# Patient Record
Sex: MALE | Race: WHITE | NOT HISPANIC OR LATINO | URBAN - METROPOLITAN AREA
[De-identification: names, ages, dates, MRNs, and addresses within clinical notes are randomized per-mention and may not be internally consistent; named-entity substitution may affect disease eponyms.]

---

## 2023-10-02 ENCOUNTER — HOSPITAL ENCOUNTER (EMERGENCY)
Facility: HOSPITAL | Age: 33
Discharge: STILL ADMITTED OR ADMINISTRATIVE | DRG: 885 | End: 2023-10-03
Attending: EMERGENCY MEDICINE

## 2023-10-02 VITALS
RESPIRATION RATE: 16 BRPM | SYSTOLIC BLOOD PRESSURE: 142 MMHG | OXYGEN SATURATION: 98 % | BODY MASS INDEX: 22.73 KG/M2 | HEIGHT: 68 IN | HEART RATE: 90 BPM | DIASTOLIC BLOOD PRESSURE: 78 MMHG | TEMPERATURE: 98.1 F | WEIGHT: 150 LBS

## 2023-10-02 DIAGNOSIS — R45.851 SUICIDAL IDEATION: ICD-10-CM

## 2023-10-02 DIAGNOSIS — R45.850 HOMICIDAL IDEATION: ICD-10-CM

## 2023-10-02 DIAGNOSIS — F99 PSYCHIATRIC DIAGNOSIS: Primary | ICD-10-CM

## 2023-10-02 LAB
ALBUMIN SERPL BCP-MCNC: 4.4 G/DL (ref 3.4–5)
ALP SERPL-CCNC: 50 U/L (ref 33–120)
ALT SERPL W P-5'-P-CCNC: 13 U/L (ref 10–52)
AMPHETAMINES UR QL SCN: ABNORMAL
ANION GAP SERPL CALC-SCNC: 11 MMOL/L (ref 10–20)
APAP SERPL-MCNC: <10 UG/ML
AST SERPL W P-5'-P-CCNC: 14 U/L (ref 9–39)
BARBITURATES UR QL SCN: ABNORMAL
BASOPHILS # BLD AUTO: 0.03 X10*3/UL (ref 0–0.1)
BASOPHILS NFR BLD AUTO: 0.2 %
BENZODIAZ UR QL SCN: ABNORMAL
BILIRUB SERPL-MCNC: 1.2 MG/DL (ref 0–1.2)
BUN SERPL-MCNC: 20 MG/DL (ref 6–23)
BZE UR QL SCN: ABNORMAL
CALCIUM SERPL-MCNC: 9.5 MG/DL (ref 8.6–10.3)
CANNABINOIDS UR QL SCN: ABNORMAL
CHLORIDE SERPL-SCNC: 103 MMOL/L (ref 98–107)
CK SERPL-CCNC: 100 U/L (ref 0–325)
CO2 SERPL-SCNC: 29 MMOL/L (ref 21–32)
CREAT SERPL-MCNC: 0.76 MG/DL (ref 0.5–1.3)
EOSINOPHIL # BLD AUTO: 0.07 X10*3/UL (ref 0–0.7)
EOSINOPHIL NFR BLD AUTO: 0.5 %
ERYTHROCYTE [DISTWIDTH] IN BLOOD BY AUTOMATED COUNT: 12 % (ref 11.5–14.5)
ETHANOL SERPL-MCNC: <10 MG/DL
FENTANYL+NORFENTANYL UR QL SCN: ABNORMAL
GFR SERPL CREATININE-BSD FRML MDRD: >90 ML/MIN/1.73M*2
GLUCOSE SERPL-MCNC: 100 MG/DL (ref 74–99)
HCT VFR BLD AUTO: 44 % (ref 41–52)
HGB BLD-MCNC: 15.5 G/DL (ref 13.5–17.5)
IMM GRANULOCYTES # BLD AUTO: 0.04 X10*3/UL (ref 0–0.7)
IMM GRANULOCYTES NFR BLD AUTO: 0.3 % (ref 0–0.9)
LYMPHOCYTES # BLD AUTO: 3.11 X10*3/UL (ref 1.2–4.8)
LYMPHOCYTES NFR BLD AUTO: 22.3 %
MCH RBC QN AUTO: 31.6 PG (ref 26–34)
MCHC RBC AUTO-ENTMCNC: 35.2 G/DL (ref 32–36)
MCV RBC AUTO: 90 FL (ref 80–100)
MONOCYTES # BLD AUTO: 1.04 X10*3/UL (ref 0.1–1)
MONOCYTES NFR BLD AUTO: 7.5 %
NEUTROPHILS # BLD AUTO: 9.63 X10*3/UL (ref 1.2–7.7)
NEUTROPHILS NFR BLD AUTO: 69.2 %
NRBC BLD-RTO: 0 /100 WBCS (ref 0–0)
OPIATES UR QL SCN: ABNORMAL
OXYCODONE+OXYMORPHONE UR QL SCN: ABNORMAL
PCP UR QL SCN: ABNORMAL
PLATELET # BLD AUTO: 243 X10*3/UL (ref 150–450)
PMV BLD AUTO: 10.4 FL (ref 7.5–11.5)
POTASSIUM SERPL-SCNC: 3.2 MMOL/L (ref 3.5–5.3)
PROT SERPL-MCNC: 6.9 G/DL (ref 6.4–8.2)
RBC # BLD AUTO: 4.9 X10*6/UL (ref 4.5–5.9)
SALICYLATES SERPL-MCNC: <3 MG/DL
SARS-COV-2 RNA RESP QL NAA+PROBE: NOT DETECTED
SODIUM SERPL-SCNC: 140 MMOL/L (ref 136–145)
WBC # BLD AUTO: 13.9 X10*3/UL (ref 4.4–11.3)

## 2023-10-02 PROCEDURE — 82550 ASSAY OF CK (CPK): CPT | Performed by: PHYSICIAN ASSISTANT

## 2023-10-02 PROCEDURE — 80349 CANNABINOIDS NATURAL: CPT | Performed by: PHYSICIAN ASSISTANT

## 2023-10-02 PROCEDURE — 85025 COMPLETE CBC W/AUTO DIFF WBC: CPT | Performed by: PHYSICIAN ASSISTANT

## 2023-10-02 PROCEDURE — 36415 COLL VENOUS BLD VENIPUNCTURE: CPT | Performed by: PHYSICIAN ASSISTANT

## 2023-10-02 PROCEDURE — 99285 EMERGENCY DEPT VISIT HI MDM: CPT | Performed by: EMERGENCY MEDICINE

## 2023-10-02 PROCEDURE — 80320 DRUG SCREEN QUANTALCOHOLS: CPT | Performed by: PHYSICIAN ASSISTANT

## 2023-10-02 PROCEDURE — 80053 COMPREHEN METABOLIC PANEL: CPT | Performed by: PHYSICIAN ASSISTANT

## 2023-10-02 PROCEDURE — 80307 DRUG TEST PRSMV CHEM ANLYZR: CPT | Performed by: PHYSICIAN ASSISTANT

## 2023-10-02 PROCEDURE — 87635 SARS-COV-2 COVID-19 AMP PRB: CPT | Performed by: EMERGENCY MEDICINE

## 2023-10-02 SDOH — HEALTH STABILITY: MENTAL HEALTH: HAVE YOU ACTUALLY HAD ANY THOUGHTS OF KILLING YOURSELF?: YES

## 2023-10-02 SDOH — HEALTH STABILITY: MENTAL HEALTH: NON-SPECIFIC ACTIVE SUICIDAL THOUGHTS (PAST 1 MONTH): YES

## 2023-10-02 SDOH — HEALTH STABILITY: MENTAL HEALTH: HAVE YOU BEEN THINKING ABOUT HOW YOU MIGHT DO THIS?: NO

## 2023-10-02 SDOH — HEALTH STABILITY: MENTAL HEALTH
HAVE YOU STARTED TO WORK OUT OR WORKED OUT THE DETAILS OF HOW TO KILL YOURSELF? DO YOU INTENT TO CARRY OUT THIS PLAN?: NO

## 2023-10-02 SDOH — HEALTH STABILITY: MENTAL HEALTH

## 2023-10-02 SDOH — HEALTH STABILITY: MENTAL HEALTH: SUICIDE ASSESSMENT: ADULT (C-SSRS)

## 2023-10-02 SDOH — HEALTH STABILITY: MENTAL HEALTH: ANXIETY SYMPTOMS: GENERALIZED

## 2023-10-02 SDOH — HEALTH STABILITY: MENTAL HEALTH: IN THE PAST FEW WEEKS, HAVE YOU FELT THAT YOU OR YOUR FAMILY WOULD BE BETTER OFF IF YOU WERE DEAD?: YES

## 2023-10-02 SDOH — HEALTH STABILITY: MENTAL HEALTH: HAVE YOU HAD THESE THOUGHTS AND HAD SOME INTENTION OF ACTING ON THEM?: NO

## 2023-10-02 SDOH — SOCIAL STABILITY: SOCIAL INSECURITY: FAMILY BEHAVIORS: APPROPRIATE FOR SITUATION

## 2023-10-02 SDOH — HEALTH STABILITY: MENTAL HEALTH: NEEDS EXPRESSED: DENIES

## 2023-10-02 SDOH — HEALTH STABILITY: MENTAL HEALTH: HAVE YOU HAD THESE THOUGHTS AND HAD SOME INTENTION OF ACTING ON THEM?: YES

## 2023-10-02 SDOH — HEALTH STABILITY: MENTAL HEALTH: HAVE YOU ACTUALLY HAD ANY THOUGHTS OF KILLING YOURSELF?: NO

## 2023-10-02 SDOH — HEALTH STABILITY: MENTAL HEALTH: HAVE YOU EVER TRIED TO KILL YOURSELF?: YES

## 2023-10-02 SDOH — HEALTH STABILITY: MENTAL HEALTH: BEHAVIORS/MOOD: CALM;COOPERATIVE

## 2023-10-02 SDOH — ECONOMIC STABILITY: GENERAL

## 2023-10-02 SDOH — HEALTH STABILITY: MENTAL HEALTH: SLEEP PATTERN: DIFFICULTY FALLING ASLEEP

## 2023-10-02 SDOH — HEALTH STABILITY: MENTAL HEALTH: HAVE YOU EVER DONE ANYTHING, STARTED TO DO ANYTHING, OR PREPARED TO DO ANYTHING TO END YOUR LIFE?: NO

## 2023-10-02 SDOH — HEALTH STABILITY: MENTAL HEALTH: SUICIDAL BEHAVIOR (3 MONTHS): NO

## 2023-10-02 SDOH — HEALTH STABILITY: MENTAL HEALTH
DEPRESSION SYMPTOMS: APPETITE CHANGE;CHANGE IN ENERGY LEVEL;FEELINGS OF HELPLESSNESS;FEELINGS OF HOPELESSESS;FEELINGS OF WORTHLESSNESS;INCREASED IRRITABILITY;ISOLATIVE;LOSS OF INTEREST;SLEEP DISTURBANCE

## 2023-10-02 SDOH — ECONOMIC STABILITY: HOUSING INSECURITY: FEELS SAFE LIVING IN HOME: YES

## 2023-10-02 SDOH — SOCIAL STABILITY: SOCIAL NETWORK: VISITOR BEHAVIORS: APPROPRIATE FOR SITUATION;CALM

## 2023-10-02 SDOH — HEALTH STABILITY: MENTAL HEALTH: SLEEP PATTERN: RESTLESSNESS

## 2023-10-02 SDOH — HEALTH STABILITY: MENTAL HEALTH: HAVE YOU WISHED YOU WERE DEAD OR WISHED YOU COULD GO TO SLEEP AND NOT WAKE UP?: NO

## 2023-10-02 SDOH — HEALTH STABILITY: MENTAL HEALTH: IN THE PAST FEW WEEKS, HAVE YOU WISHED YOU WERE DEAD?: YES

## 2023-10-02 SDOH — HEALTH STABILITY: MENTAL HEALTH: IN THE PAST WEEK, HAVE YOU BEEN HAVING THOUGHTS ABOUT KILLING YOURSELF?: YES

## 2023-10-02 SDOH — HEALTH STABILITY: MENTAL HEALTH: HAVE YOU BEEN THINKING ABOUT HOW YOU MIGHT DO THIS?: YES

## 2023-10-02 SDOH — HEALTH STABILITY: MENTAL HEALTH: ACTIVE SUICIDAL IDEATION WITH SPECIFIC PLAN AND INTENT (PAST 1 MONTH): NO

## 2023-10-02 SDOH — HEALTH STABILITY: MENTAL HEALTH: HOW DID YOU TRY TO KILL YOURSELF?: OVERDOSE, DEATH BY POLICE

## 2023-10-02 SDOH — HEALTH STABILITY: MENTAL HEALTH: ACTIVE SUICIDAL IDEATION WITH SOME INTENT TO ACT, WITHOUT SPECIFIC PLAN (PAST 1 MONTH): NO

## 2023-10-02 SDOH — HEALTH STABILITY: MENTAL HEALTH: ARE YOU HAVING THOUGHTS OF KILLING YOURSELF RIGHT NOW?: NO

## 2023-10-02 SDOH — HEALTH STABILITY: MENTAL HEALTH: WISH TO BE DEAD (PAST 1 MONTH): YES

## 2023-10-02 SDOH — HEALTH STABILITY: MENTAL HEALTH: SUICIDAL BEHAVIOR (LIFETIME): YES

## 2023-10-02 SDOH — HEALTH STABILITY: MENTAL HEALTH: HAVE YOU WISHED YOU WERE DEAD OR WISHED YOU COULD GO TO SLEEP AND NOT WAKE UP?: YES

## 2023-10-02 SDOH — SOCIAL STABILITY: SOCIAL NETWORK: VISITOR BEHAVIORS: APPROPRIATE FOR SITUATION

## 2023-10-02 SDOH — HEALTH STABILITY: MENTAL HEALTH: BEHAVIORS/MOOD: ANXIOUS

## 2023-10-02 ASSESSMENT — COLUMBIA-SUICIDE SEVERITY RATING SCALE - C-SSRS
2. HAVE YOU ACTUALLY HAD ANY THOUGHTS OF KILLING YOURSELF?: NO
6. HAVE YOU EVER DONE ANYTHING, STARTED TO DO ANYTHING, OR PREPARED TO DO ANYTHING TO END YOUR LIFE?: NO
2. HAVE YOU ACTUALLY HAD ANY THOUGHTS OF KILLING YOURSELF?: NO
1. IN THE PAST MONTH, HAVE YOU WISHED YOU WERE DEAD OR WISHED YOU COULD GO TO SLEEP AND NOT WAKE UP?: NO
6. HAVE YOU EVER DONE ANYTHING, STARTED TO DO ANYTHING, OR PREPARED TO DO ANYTHING TO END YOUR LIFE?: NO
1. SINCE LAST CONTACT, HAVE YOU WISHED YOU WERE DEAD OR WISHED YOU COULD GO TO SLEEP AND NOT WAKE UP?: NO

## 2023-10-02 ASSESSMENT — PAIN - FUNCTIONAL ASSESSMENT: PAIN_FUNCTIONAL_ASSESSMENT: 0-10

## 2023-10-02 ASSESSMENT — LIFESTYLE VARIABLES
EVER HAD A DRINK FIRST THING IN THE MORNING TO STEADY YOUR NERVES TO GET RID OF A HANGOVER: NO
HAVE PEOPLE ANNOYED YOU BY CRITICIZING YOUR DRINKING: NO
SUBSTANCE_ABUSE_PAST_12_MONTHS: NO
EVER FELT BAD OR GUILTY ABOUT YOUR DRINKING: NO
HAVE YOU EVER FELT YOU SHOULD CUT DOWN ON YOUR DRINKING: NO
PRESCIPTION_ABUSE_PAST_12_MONTHS: NO

## 2023-10-02 ASSESSMENT — PAIN SCALES - GENERAL: PAINLEVEL_OUTOF10: 0 - NO PAIN

## 2023-10-02 NOTE — ED PROVIDER NOTES
HPI   Chief Complaint   Patient presents with    Depression     Pt states he would like to be [facility] to [facility]. Pt states he was diagnosed with bipolar disorder and states he does not take prescribed medications. Pt denies HI/SI.       33-year-old male with history of schizophrenia states he has had homicidal and suicidal ideations for approximately 3 weeks.  Patient denies any audio or visual hallucinations.  Patient states he his emotions are accelerating and he fears he will either hurt himself or someone else.  Patient currently has no physician for behavioral health and is currently not taking any medications for his conditions.                          No data recorded                Patient History   No past medical history on file.  No past surgical history on file.  No family history on file.  Social History     Tobacco Use    Smoking status: Not on file    Smokeless tobacco: Not on file   Substance Use Topics    Alcohol use: Not on file    Drug use: Not on file       Physical Exam   ED Triage Vitals [10/02/23 1613]   Temp Heart Rate Resp BP   36.7 °C (98.1 °F) 90 16 142/78      SpO2 Temp Source Heart Rate Source Patient Position   98 % Temporal Monitor Sitting      BP Location FiO2 (%)     Left arm --       Physical Exam  Vitals and nursing note reviewed.   Constitutional:       General: He is not in acute distress.     Appearance: He is well-developed.   HENT:      Head: Normocephalic and atraumatic.   Eyes:      Conjunctiva/sclera: Conjunctivae normal.   Cardiovascular:      Rate and Rhythm: Normal rate and regular rhythm.      Heart sounds: No murmur heard.  Pulmonary:      Effort: Pulmonary effort is normal. No respiratory distress.      Breath sounds: Normal breath sounds.   Abdominal:      Palpations: Abdomen is soft.      Tenderness: There is no abdominal tenderness.   Musculoskeletal:         General: No swelling.      Cervical back: Neck supple.   Skin:     General: Skin is warm and dry.       Capillary Refill: Capillary refill takes less than 2 seconds.   Neurological:      Mental Status: He is alert.   Psychiatric:         Mood and Affect: Mood normal.         ED Course & MDM        Medical Decision Making  HPI:33-year-old male with history of schizophrenia states he has had homicidal and suicidal ideations for approximately 3 weeks.  Patient denies any audio or visual hallucinations.  Patient states he his emotions are accelerating and he fears he will either hurt himself or someone else.  Patient currently has no physician for behavioral health and is currently not taking any medications for his conditions.    Differential diagnosis:    Pertinent physical exam:    Laboratory results:    Radiologic results:    EKG results: Please see procedure note twelve-lead EKG per my interpretation demonstrates normal sinus rhythm ventricular rate of 89 no ischemic or ectopic activity    ED course and treatment: Patient will be signed out to the oncoming provider and disposition will be made following EPAT evaluation.    Diagnosis:    Disposition:    Social determinants of health:    *This documentation is completed using the Dragon Dictation system. There may be spelling and/or grammatical errors that were not corrected prior to final submission. I apologize for any inconvenience.*              Procedure  Procedures     Hernando Cartwright PA-C  10/02/23 1856       Hernando Cartwright PA-C  10/02/23 2005

## 2023-10-02 NOTE — Clinical Note
Is the patient on isolation?: No   Do you expect the patient to require telemetry (informational-only for bed management): No

## 2023-10-03 ENCOUNTER — APPOINTMENT (OUTPATIENT)
Dept: CARDIOLOGY | Facility: HOSPITAL | Age: 33
End: 2023-10-03

## 2023-10-03 ENCOUNTER — HOSPITAL ENCOUNTER (INPATIENT)
Facility: HOSPITAL | Age: 33
LOS: 3 days | Discharge: HOME | DRG: 885 | End: 2023-10-06
Attending: PSYCHIATRY & NEUROLOGY | Admitting: PSYCHIATRY & NEUROLOGY

## 2023-10-03 DIAGNOSIS — F31.75 BIPOLAR DISORDER, IN PARTIAL REMISSION, MOST RECENT EPISODE DEPRESSED (MULTI): Primary | ICD-10-CM

## 2023-10-03 DIAGNOSIS — F99 PSYCHIATRIC DISORDER: ICD-10-CM

## 2023-10-03 LAB
ANION GAP SERPL CALC-SCNC: 15 MMOL/L (ref 10–20)
ATRIAL RATE: 56 BPM
BUN SERPL-MCNC: 18 MG/DL (ref 6–23)
CALCIUM SERPL-MCNC: 9.3 MG/DL (ref 8.6–10.3)
CHLORIDE SERPL-SCNC: 105 MMOL/L (ref 98–107)
CHOLEST SERPL-MCNC: 102 MG/DL (ref 0–199)
CHOLESTEROL/HDL RATIO: 2.3
CO2 SERPL-SCNC: 24 MMOL/L (ref 21–32)
CREAT SERPL-MCNC: 0.75 MG/DL (ref 0.5–1.3)
GFR SERPL CREATININE-BSD FRML MDRD: >90 ML/MIN/1.73M*2
GLUCOSE P FAST SERPL-MCNC: 96 MG/DL (ref 74–99)
GLUCOSE SERPL-MCNC: 91 MG/DL (ref 74–99)
HDLC SERPL-MCNC: 43.6 MG/DL
LDLC SERPL CALC-MCNC: 40 MG/DL (ref 130–180)
NON HDL CHOLESTEROL: 58 MG/DL (ref 0–149)
P AXIS: 39 DEGREES
P OFFSET: 194 MS
P ONSET: 138 MS
POTASSIUM SERPL-SCNC: 3.9 MMOL/L (ref 3.5–5.3)
PR INTERVAL: 154 MS
Q ONSET: 215 MS
QRS COUNT: 10 BEATS
QRS DURATION: 98 MS
QT INTERVAL: 404 MS
QTC CALCULATION(BAZETT): 389 MS
QTC FREDERICIA: 395 MS
R AXIS: 46 DEGREES
SODIUM SERPL-SCNC: 140 MMOL/L (ref 136–145)
T AXIS: 41 DEGREES
T OFFSET: 417 MS
TRIGL SERPL-MCNC: 92 MG/DL (ref 0–149)
VENTRICULAR RATE: 56 BPM
VLDL: 18 MG/DL (ref 0–40)

## 2023-10-03 PROCEDURE — 36415 COLL VENOUS BLD VENIPUNCTURE: CPT | Performed by: PSYCHIATRY & NEUROLOGY

## 2023-10-03 PROCEDURE — 82947 ASSAY GLUCOSE BLOOD QUANT: CPT | Performed by: PSYCHIATRY & NEUROLOGY

## 2023-10-03 PROCEDURE — 80061 LIPID PANEL: CPT | Performed by: PSYCHIATRY & NEUROLOGY

## 2023-10-03 PROCEDURE — 2500000001 HC RX 250 WO HCPCS SELF ADMINISTERED DRUGS (ALT 637 FOR MEDICARE OP): Performed by: PSYCHIATRY & NEUROLOGY

## 2023-10-03 PROCEDURE — 99222 1ST HOSP IP/OBS MODERATE 55: CPT | Performed by: REGISTERED NURSE

## 2023-10-03 PROCEDURE — 93005 ELECTROCARDIOGRAM TRACING: CPT

## 2023-10-03 PROCEDURE — 99253 IP/OBS CNSLTJ NEW/EST LOW 45: CPT | Performed by: REGISTERED NURSE

## 2023-10-03 PROCEDURE — 2500000001 HC RX 250 WO HCPCS SELF ADMINISTERED DRUGS (ALT 637 FOR MEDICARE OP): Performed by: REGISTERED NURSE

## 2023-10-03 PROCEDURE — 1240000001 HC SEMI-PRIVATE BH ROOM DAILY

## 2023-10-03 PROCEDURE — 80048 BASIC METABOLIC PNL TOTAL CA: CPT | Performed by: REGISTERED NURSE

## 2023-10-03 RX ORDER — DIPHENHYDRAMINE HYDROCHLORIDE 50 MG/ML
50 INJECTION INTRAMUSCULAR; INTRAVENOUS ONCE AS NEEDED
Status: CANCELLED | OUTPATIENT
Start: 2023-10-03

## 2023-10-03 RX ORDER — DIPHENHYDRAMINE HYDROCHLORIDE 50 MG/ML
50 INJECTION INTRAMUSCULAR; INTRAVENOUS ONCE AS NEEDED
Status: DISCONTINUED | OUTPATIENT
Start: 2023-10-03 | End: 2023-10-06 | Stop reason: HOSPADM

## 2023-10-03 RX ORDER — ACETAMINOPHEN 325 MG/1
650 TABLET ORAL EVERY 4 HOURS PRN
Status: DISCONTINUED | OUTPATIENT
Start: 2023-10-03 | End: 2023-10-06 | Stop reason: HOSPADM

## 2023-10-03 RX ORDER — DIPHENHYDRAMINE HCL 25 MG
50 CAPSULE ORAL EVERY 6 HOURS PRN
Status: DISCONTINUED | OUTPATIENT
Start: 2023-10-03 | End: 2023-10-06 | Stop reason: HOSPADM

## 2023-10-03 RX ORDER — DIPHENHYDRAMINE HCL 25 MG
50 CAPSULE ORAL EVERY 6 HOURS PRN
Status: CANCELLED | OUTPATIENT
Start: 2023-10-03

## 2023-10-03 RX ORDER — ACETAMINOPHEN 325 MG/1
650 TABLET ORAL EVERY 4 HOURS PRN
Status: CANCELLED | OUTPATIENT
Start: 2023-10-03

## 2023-10-03 RX ORDER — HYDROXYZINE HYDROCHLORIDE 25 MG/1
50 TABLET, FILM COATED ORAL EVERY 6 HOURS PRN
Status: CANCELLED | OUTPATIENT
Start: 2023-10-03

## 2023-10-03 RX ORDER — OLANZAPINE 5 MG/1
5 TABLET ORAL EVERY 6 HOURS PRN
Status: CANCELLED | OUTPATIENT
Start: 2023-10-03

## 2023-10-03 RX ORDER — OLANZAPINE 5 MG/1
5 TABLET ORAL EVERY 6 HOURS PRN
Status: DISCONTINUED | OUTPATIENT
Start: 2023-10-03 | End: 2023-10-06 | Stop reason: HOSPADM

## 2023-10-03 RX ORDER — HYDROXYZINE HYDROCHLORIDE 25 MG/1
50 TABLET, FILM COATED ORAL EVERY 6 HOURS PRN
Status: DISCONTINUED | OUTPATIENT
Start: 2023-10-03 | End: 2023-10-06 | Stop reason: HOSPADM

## 2023-10-03 RX ORDER — OLANZAPINE 10 MG/2ML
5 INJECTION, POWDER, FOR SOLUTION INTRAMUSCULAR EVERY 6 HOURS PRN
Status: DISCONTINUED | OUTPATIENT
Start: 2023-10-03 | End: 2023-10-06 | Stop reason: HOSPADM

## 2023-10-03 RX ORDER — DIVALPROEX SODIUM 250 MG/1
250 TABLET, FILM COATED, EXTENDED RELEASE ORAL NIGHTLY
Status: DISCONTINUED | OUTPATIENT
Start: 2023-10-03 | End: 2023-10-04

## 2023-10-03 RX ORDER — OLANZAPINE 10 MG/2ML
5 INJECTION, POWDER, FOR SOLUTION INTRAMUSCULAR EVERY 6 HOURS PRN
Status: CANCELLED | OUTPATIENT
Start: 2023-10-03

## 2023-10-03 RX ADMIN — HYDROXYZINE HYDROCHLORIDE 50 MG: 25 TABLET ORAL at 20:17

## 2023-10-03 RX ADMIN — DIVALPROEX SODIUM 250 MG: 250 TABLET, FILM COATED, EXTENDED RELEASE ORAL at 20:14

## 2023-10-03 SDOH — HEALTH STABILITY: MENTAL HEALTH: HAVE YOU HAD THESE THOUGHTS AND HAD SOME INTENTION OF ACTING ON THEM?: NO

## 2023-10-03 SDOH — HEALTH STABILITY: MENTAL HEALTH: HAVE YOU USED ANY SUBSTANCES (CANABIS, COCAINE, HEROIN, HALLUCINOGENS, INHALANTS, ETC.) IN THE PAST 12 MONTHS?: YES

## 2023-10-03 SDOH — HEALTH STABILITY: MENTAL HEALTH: HAVE YOU ACTUALLY HAD ANY THOUGHTS OF KILLING YOURSELF?: NO

## 2023-10-03 SDOH — HEALTH STABILITY: MENTAL HEALTH: HAVE YOU BEEN THINKING ABOUT HOW YOU MIGHT DO THIS?: NO

## 2023-10-03 SDOH — HEALTH STABILITY: MENTAL HEALTH: HAVE YOU EVER DONE ANYTHING, STARTED TO DO ANYTHING, OR PREPARED TO DO ANYTHING TO END YOUR LIFE?: NO

## 2023-10-03 SDOH — HEALTH STABILITY: MENTAL HEALTH: HAVE YOU WISHED YOU WERE DEAD OR WISHED YOU COULD GO TO SLEEP AND NOT WAKE UP?: NO

## 2023-10-03 SDOH — HEALTH STABILITY: MENTAL HEALTH: BEHAVIORS/MOOD: CALM;COOPERATIVE

## 2023-10-03 SDOH — HEALTH STABILITY: MENTAL HEALTH: SUICIDE ASSESSMENT: ADULT (C-SSRS)

## 2023-10-03 SDOH — SOCIAL STABILITY: SOCIAL NETWORK: EMOTIONAL SUPPORT GIVEN: REASSURE

## 2023-10-03 SDOH — HEALTH STABILITY: MENTAL HEALTH: NEEDS EXPRESSED: DENIES

## 2023-10-03 ASSESSMENT — PAIN - FUNCTIONAL ASSESSMENT
PAIN_FUNCTIONAL_ASSESSMENT: 0-10
PAIN_FUNCTIONAL_ASSESSMENT: 0-10

## 2023-10-03 ASSESSMENT — PAIN SCALES - GENERAL
PAINLEVEL_OUTOF10: 0 - NO PAIN

## 2023-10-03 NOTE — PROGRESS NOTES
I except this handoff pending EPAT evaluation and recommendation.    Patient was recommended for admission.  Patient was placed on 5 W. by Dr. Guzman

## 2023-10-03 NOTE — PROGRESS NOTES
"Occupational Therapy    Evaluation    Patient Name: Vicente Acosta  MRN: [id number]  Today's Date: 10/3/2023  Time Calculation  Start Time: 0800  Stop Time: 0830  Time Calculation (min): 30 min    Assessment  IP OT Assessment  OT Assessment: Pt agreeable to counseling/meds, but no desire for [facility]. (\"I'm tired of my mood swings & hope meds don't give my bad side effects.\")  Prognosis: Good  Evaluation/Treatment Tolerance: Patient tolerated treatment well  Plan:  Occupational Therapy Intervention Plan:    OT Interventions: Therapeutic use of self/activities, OT Task Skills Group/1:1, OT Life Management Skills Group/1:1, Grief/Anger Management Group/1:1, ADL/IADL Group/1:1        Subjective   Current Problem:  1. Psychiatric disorder          General:  General  Prior to Session Communication: Charge Nurse (Hi RN noted Pt had d/o Bipolar w/S.I. & H.I. though no one in particular)  Preferred Learning Style: auditory, verbal, written  General Comment: h/o Bipolar on [facility] in 2019, C.D. issues(1.5yrs sober until 10/1/2023)  Precautions:  Medical Precautions: No known precautions/limitation  Vital Signs:     Pain:       Objective   Cognition:  Overall Cognitive Status: Within Functional Limits  Orientation Level: Oriented X4  Safety/Judgement:  (Pt minimized his S.I. & H.I., denying both at this time.)  Insight: Moderate (Pt aware of & wanting help for poor anger management issues, but minimized the need for C.D. info or assistance like AAmtgs)  Impulsive: Moderately (Pt admitted remorse for impulsively destroying items in the home when angry)  Processing Speed: Within funtional limits           Home Living:  Type of Home: House  Home Living Comments: Pt's mom owns the house, Gladys refuses to leave though ended the relationship w/Pt 9/7/2023 (Pt and Gladys(Girl friend/baby mom) live w/their 1 & 2yo sons Babs & Donald)   Prior Function:  Level of [place]: Independent with ADLs and functional " "transfers  Receives Help From: Family (re: : [place] mom takes the boys ~8pm for the night so that Pt can sleep while Gladys works 7p-7a(LPN at [employer]))  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 I was isolating I guess. Now she's done w/me over all stuff.\")  IADL History:  Homemaking Responsibilities:  (Pt noted his Girl friend is responsible for most chores and errands. Pt does some  while awaiting the arrival Gladys's mom each evening.)  Current License: Yes  Education: HS  ADL:  ADL Comments: WFL        IADL's:   Homemaking Responsibilities:  (Pt noted his Girl friend is responsible for most chores and errands. Pt does some  while awaiting the arrival Veronica mom each evening.)  Current License: Yes  Education: HS     Education: handout on Clinical depression & Balanced Daily Routine reviewed w/patient.  Goals:   Encounter Problems       Encounter Problems (Active)       OT Goals       Communication/Interaction Skills (Self-expression/social Behavior/assertive)       Start:  10/03/23    Expected End:  10/31/23       Explore/demonstrate 1-2 effective methods of expressing feelings/wants/needs (can include assertion/engaging/sharing/asking) prior to discharge          Coping Skills       Start:  10/03/23    Expected End:  10/31/23       Explore/identify 1-2 effective strategies of expressing feelings, wants, needs(can include assertion, engaging, sharing, asking) prior to discharge           Emotion Regulation/self control       Start:  10/03/23    Expected End:  10/31/23       Pt will exhibit appropriate range, regulation of emotions, impulse control, frustration tolerance in OT groups prior to discharge.                   "

## 2023-10-03 NOTE — GROUP NOTE
Group Topic: Music Therapy   Group Date: 10/3/2023  Start Time: 1300  End Time: 1400  Facilitators: Jennifer Bowers   Department: [facility] EXPRESSIVE THER VIRTUAL    Number of Participants: 4   Group Focus: problem solving and self-awareness  Treatment Modality: Music Therapy  Interventions Utilized were: other Cornelia analysis and passive music engagement      Name: Vicente Acosta YOB: 1990   MR: [id number]      Level of Participation: minimal  Quality of Participation: defensive and isolative  Interactions with others:  Isolative  Mood/Affect: flat and irritable  Cognition, Pre Treatment: capable and pressured speech  Cognition, Post Treatment: capable  Progress: Minimal  Plan: continue with services

## 2023-10-03 NOTE — SIGNIFICANT EVENT
Application for Emergency Admission      Ready for Transfer?  Is the patient medically cleared for transfer to inpatient psychiatry: Yes  Has the patient been accepted to an inpatient psychiatric hospital: Yes    Application for Emergency Admission  IN ACCORDANCE WITH [place] 5122[place]10 [place]  The Chief Clinical Officer of: [facility]./Dr. Guzman 10/3/2023 .2:07 AM    Reason for Hospitalization  The undersigned has reason to believe that: Vicente Acosta Is a mentally ill person subject to hospitalization by court order under division B Section 5122.01 of the Revised Code, i.e., this person:    1.Yes  Represents a substantial risk of physical harm to self as manifested by evidence of threats of, or attempts at, suicide or serious self-inflicted bodily harm    2.Yes Represents a substantial risk of physical harm to others as manifested by evidence of recent homicidal or other violent behavior, evidence of recent threats that place another in reasonable fear of violent behavior and serious physical harm, or other evidence of present dangerousness    3.No Represents a substantial and immediate risk of serious physical impairment or injury to self as manifested by  evidence that the person is unable to provide for and is not providing for the person's basic physical needs because of the person's mental illness and that appropriate provision for those needs cannot be made  immediately available in the community    4.Yes Would benefit from treatment in a hospital for his mental illness and is in need of such treatment as manifested by evidence of behavior that creates a grave and imminent risk to substantial rights of others or  himself.    5.Yes Would benefit from treatment as manifested by evidence of behavior that indicates all of the following:       (a) The person is unlikely to survive safely in the community without supervision, based on a clinical determination.       (b) The  person has a history of lack of compliance with treatment for mental illness and one of the following applies:      (i) At least twice within the thirty-six months prior to the filing of an affidavit seeking court-ordered treatment of the person under section 5122.111 of the Revised Code, the lack of compliance has been a significant factor in necessitating hospitalization in a hospital or receipt of services in a forensic or other mental health unit of a correctional facility, provided that the thirty-six-month period shall be extended by the length of any hospitalization or incarceration of the person that occurred within the thirty-six-month period.      (ii) Within the forty-eight months prior to the filing of an affidavit seeking court-ordered treatment of the person under section 5122.111 of the Revised Code, the lack of compliance resulted in one or more acts of serious violent behavior toward self or others or threats of, or attempts at, serious physical harm to self or others, provided that the forty-eight-month period shall be extended by the length of any hospitalization or incarceration of the person that occurred within the forty-eight-month period.      (c) The person, as a result of mental illness, is unlikely to voluntarily participate in necessary treatment.       (d) In view of the person's treatment history and current behavior, the person is in need of treatment in order to prevent a relapse or deterioration that would be likely to result in substantial risk of serious harm to the person or others.    (e) Represents a substantial risk of physical harm to self or others if allowed to remain at liberty pending examination.    Therefore, it is requested that said person be admitted to the above named facility.    STATEMENT OF BELIEF    Must be filled out by one of the following: a psychiatrist, licensed physician, licensed clinical psychologist, health or ,  or deputy  .  (Statement shall include the circumstances under which the individual was taken into custody and the reason for the person's belief that hospitalization is necessary. The statement shall also include a reference to efforts made to secure the individual's property at his residence if he was taken into custody there. Every reasonable and appropriate effort should be made to take this person into custody in the least conspicuous manner possible.)    Patient presents to the emergency department today with suicidal ideations as well as homicidal ideations.  Patient would benefit from inpatient psychiatric care    Dr. Crabtree 10/3/2023     _____________________________________________________________   Place of Employment:     STATEMENT OF OBSERVATION BY PSYCHIATRIST, LICENSED PHYSICIAN, OR LICENSED CLINICAL PSYCHOLOGIST, IF APPLICABLE    Place of Observation (e.g., [facility] mental [facility] center, general hospital, office, emergency facility)  (If applicable, please complete)    Dr. Crabtree 10/3/2023    _____________________________________________________________

## 2023-10-03 NOTE — CARE PLAN
The clinical goals for the shift include To be active in treatment plan.    Patient denies SI, HI, A/VH. Patient mood irritable, anxious and depressed. Affect flat. Patient very demanding and easily frustrated with staff when staff enforces unit protocol. Patient educated on new medication, provided with paperwork and verbalized understanding. Patient observed to be out on unit this shift, visited with mother briefly. Patient able to make needs known.       Problem: Fall/Injury  Goal: Not fall by end of shift  10/3/2023 1846 by Nieves Aparicio RN  Outcome: Progressing  10/3/2023 1845 by Nieves Aparicio RN  Outcome: Progressing  Goal: Be free from injury by end of the shift  10/3/2023 1846 by Nieves Aparicio RN  Outcome: Progressing  10/3/2023 1845 by Nieves Aparicio RN  Outcome: Progressing  Goal: Verbalize understanding of personal risk factors for fall in the hospital  10/3/2023 1846 by Nieves Aparicio RN  Outcome: Progressing  10/3/2023 1845 by Nieves Aparicio RN  Outcome: Progressing  Goal: Verbalize understanding of risk factor reduction measures to prevent injury from fall in the home  10/3/2023 1846 by Nieves Aparicio RN  Outcome: Progressing  10/3/2023 1845 by Nieves Aparicio RN  Outcome: Progressing  Goal: Use assistive devices by end of the shift  10/3/2023 1846 by Nieves Aparicio RN  Outcome: Progressing  10/3/2023 1845 by Nieves Aparicio RN  Outcome: Progressing  Goal: Pace activities to prevent fatigue by end of the shift  10/3/2023 1846 by Nieves Aparicio RN  Outcome: Progressing  10/3/2023 1845 by Nieves Aparicio RN  Outcome: Progressing     Problem: Sensory Perceptual Alteration as Evidenced by  Goal: Cooperates with admission process  Outcome: Progressing  Goal: Patient/Family participate in treatment and discharge plans  Outcome: Progressing  Goal: Patient/Family verbalizes awareness of resources  Outcome: Progressing  Goal: Participates in unit activities  Outcome: Progressing  Goal: Discusses signs/symptoms of  illness/treatment options  Outcome: Progressing  Goal: Initiates reality-based interactions  Outcome: Progressing  Goal: Able to discuss content of hallucinations/delusions  Outcome: Progressing  Goal: Notifies staff when experiencing hallucinations/delusions  Outcome: Progressing  Goal: Verbalizes reduction in hallucinations/delusions  Outcome: Progressing  Goal: Will not act on psychotic perception  Outcome: Progressing  Goal: Understands least restrictive measures  Outcome: Progressing  Goal: Free from restraint events  Outcome: Progressing     Problem: Altered Thought Processes as Evidenced by  Goal: STG - Desires improvement in ability to think and concentrate  Outcome: Progressing  Goal: STG - Participates in Occupational Therapy and other cognitive assessments  Outcome: Progressing     Problem: Potential for Harm to Self or Others  Goal: Cooperates with admission process  Outcome: Progressing  Goal: Participates in unit activities  Outcome: Progressing  Goal: Patient/Family participate in treatment and discharge plans  Outcome: Progressing  Goal: Identifies deescalation techniques  Outcome: Progressing  Goal: Understands least restrictive measures  Outcome: Progressing  Goal: Identifies stressors that lead to harmful behaviors  Outcome: Progressing  Goal: Notifies staff when experiencing harmful thoughts toward self/others  Outcome: Progressing  Goal: Denies harm toward self or others  Outcome: Progressing  Goal: Free from restraint events  Outcome: Progressing     Problem: Educational/Scholastic Disruption  Goal: Meets educational requirements during hospitalization  Outcome: Progressing  Goal: Attends class without disruptive behavior  Outcome: Progressing  Goal: Completes daily assignments  Outcome: Progressing     Problem: Ineffective Coping  Goal: Cooperates with admission process  Outcome: Progressing  Goal: Identifies ineffective coping skills  Outcome: Progressing  Goal: Identifies healthy coping  skills  Outcome: Progressing  Goal: Demonstrates healthy coping skills  Outcome: Progressing  Goal: Participates in unit activities  Outcome: Progressing  Goal: Patient/Family participate in treatment and discharge plans  Outcome: Progressing  Goal: Patient/Family verbalizes awareness of resources  Outcome: Progressing  Goal: Understands least restrictive measures  Outcome: Progressing  Goal: Free from restraint events  Outcome: Progressing     Problem: Alteration in Sleep  Goal: STG - Reports nightly sleep, duration, and quality  Outcome: Progressing  Goal: STG - Identifies sleep hygiene aids  Outcome: Progressing  Goal: STG - Informs staff if unable to sleep  Outcome: Progressing  Goal: STG - Attends breathing and relaxation group  Outcome: Progressing     Problem: Potential for Substance Withdrawal  Goal: Verbalizes signs/symptoms of withdrawal  Outcome: Progressing  Goal: Reports signs/symptoms of withdrawal  Outcome: Progressing  Goal: Free of withdrawal symptoms  Outcome: Progressing     Problem: Anxiety  Goal: Patient/family understands admission protocols  Outcome: Progressing  Goal: Attempts to manage anxiety with help  Outcome: Progressing  Goal: Verbalizes ways to manage anxiety  Outcome: Progressing  Goal: Implements measures to reduce anxiety  Outcome: Progressing  Goal: Free from restraint events  Outcome: Progressing     Problem: Self Care Deficit  Goal: STG - Patient completes hygiene  Outcome: Progressing  Goal: Increase group attendance  Outcome: Progressing  Goal: Accepts need for medications  Outcome: Progressing  Goal: STG - Goes to and eats meals independently in dining room 100% of time  Outcome: Progressing     Problem: Defensive Coping  Goal: Cooperates with admission process  Outcome: Progressing  Goal: Identifies reckless/dangerous behavior  Outcome: Progressing  Goal: Identifies stressors that lead to reckless/dangerous behavior  Outcome: Progressing  Goal: Discusses and identifies  healthy coping skills  Outcome: Progressing  Goal: Demonstrates healthy coping skills  Outcome: Progressing  Goal: Identifies appropriate social interaction  Outcome: Progressing  Goal: Demonstrates appropriate social interactions  Outcome: Progressing  Goal: Patient/Family verbalizes awareness of resources  Outcome: Progressing  Goal: Discusses signs/symptoms of illness/treatment options  Outcome: Progressing  Goal: Patient/Family participate in treatment and discharge plans  Outcome: Progressing  Goal: Understands least restrictive measures  Outcome: Progressing  Goal: Free from restraint events  Outcome: Progressing
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
## 2023-10-03 NOTE — CONSULTS
"Consults    Reason For Consult  Adult medical examination and optimization for behavioral health unit      History Of Present Illness  Vicente Acosta is a 33 y.o. male presenting with with homicidal and suicidal ideations. Stated to ED he was having homicidal and suicidal ideations for approximately 3 weeks.  Patient came to the ED due to the fact he was afraid that he would hurt somebody.  EKG was reviewed by previous provider with normal sinus rhythm of 89 with no ischemic activity.  CBC sodium 140, potassium 3.2, creatinine 0.76, ALT and AST within normal limits, WBC was 13.9, her hemoglobin and reticulocyte count within normal limits, platelets 243, urine toxicology positive for cannabis.  Patient was medically cleared for EPAT evaluation.  Hospitalist to consult for adult medical examination after history for behavioral health.    Patient examined and seen. Alert and oriented x3, explained to patient assessment, right to , and the right to decline assessment. Patient verbalized understanding and agreed to assessment with RN as . Patient denies chest pain, shortness of breath, palpitations, abdominal pain, fever or chills.       Hospitalist to evaluate medical optimization for psychiatric treatment and evaluation.  Neurological evaluation completed.  No acute or chronic medical issues identified at this time that could be contributing to underlying psychiatric symptoms. Pt is medically optimized for inpatient behavioral health evaluation and treatment.     Review of systems: 10 system were reviewed and were negative except what was mentioned in history of present illness        Past Medical History  Schizophrenia, denies any other medical history     Surgical History  He has no past surgical history on file.     Social History  Daily tobacco use, daily marijuana use \"as much as I can get\", sober 1 year and relapsed days before admittance    Family History  Anxiety      Allergies  Ceclor " [cefaclor]    Review of Systems     Physical Exam  Constitutional: Well developed, awake/alert/oriented x3, no distress, cooperative  Eyes: PERRL, EOMI, clear sclera  ENMT: mucous membranes moist, no apparent injury, no lesions seen  Head/Neck: Neck supple, no apparent injury,   Respiratory/Thorax: Patent airways,  normal breath sounds   Cardiovascular: Regular, rate and rhythm, no murmurs,  normal S 1and S 2  Gastrointestinal: Nondistended, soft, non-tender,    Genitourinary: denies CVA tenderness  or suprapubic tenderness,  voiding freely   Musculoskeletal: ROM intact, no joint swelling,   Extremities: normal extremities,  no contusions or wounds seen   Skin: warm, dry, intact, scabbed areas to knuckles to right hand   Neurological: alert/oriented x 3, speech clear, cranial nerves II through XII  grossly intact  Psychiatric: flat affect, guarded, cooperative   Cranial Nerve Exam: II, III, IV, VI: Visual acuity within normal limits bilaterally, visual fields normal in all quadrants, JENAE, EOMI  Cranial Nerve exam: V: Facial sensations intact bilaterally to dull, sharp, and light touch stimuli  Cranial Nerve exam VII: Facial muscle strength normal/equal bilaterally  Cranial Nerve Exam VIII: Hearing is normal bilaterally  Cranial Nerve IX,X: Palate and Uvula symmetrical, voice is normal  Cranial Nerve XI: Shoulder shrug strong, equal bilaterally  Cranial Nerve XII: Tongue moves symmetrically  Motor : Good muscle tone, Strength equal upper and lower extremities unless otherwise stated above  Cerebellar: normal gait unless otherwise stated above      Last Recorded Vitals  /78 (BP Location: Right arm)   Pulse 69   Temp 36.5 °C (97.7 °F) (Temporal)   Resp 18   Wt 68 kg (150 lb)   SpO2 97%     Relevant Results  See above      Assessment/Plan     # Suicidal Ideations  Homicidal Ideations  Hx of Schizophrenia   Admit to Behavioral Health Unit  Contract for Safety   Safety Protocols  Medications to be determined  by psychiatry   Vital Signs BID  Group Therapy as appropriate  Social Work for outpatient therapy   Encourage Healthy Lifestyle and Exercise as appropriate     #Tobacco Dependency / Substance Abuse - alcohol and marijuana use   Nicotine cessation   Risks discussed  Encourage abstinence  Inpatient/Outpatient treatment therapies    CBC/BMP pending     Thank you for consult  MEDICINE TO SIGN OFF  CALL FOR ACUTE NEEDS    Time spent  48 minutes obtaining labs, imaging, recommendations, interview, assessment, examination, medication review/ordering, and EMR review.    Plan of care was discussed extensively with patient. Patient verbalized understanding through teach back method. All questions and concerns addressed upon examination.     Of note, this documentation is completed using the Dragon Dictation system (voice recognition software). There may be spelling and/or grammatical errors that were not corrected prior to final submission.        Litzy Aquino, APRN-CNP
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
## 2023-10-03 NOTE — CARE PLAN
"Pt with hx of depression and BiPolar Disorder, here due to SI and HI. Reports having rage and sadness, and increased mood swings in the context of increased stress over the last 3 weeks which was scarring him.  Pt presents as cooperative, but irritable and terse.  Pt's stressors are his symptoms and his relationship issues with ex-girlfriend with whom he lives along with 2 kids, ages 1 & 3, and cat.     Pt reports \"Girlfriend left me, and keeps throwing it in my face\"  Pt reports plan to return home and says his mother is the landlord and will kick her out, if need be.     Today pt is also stressed about FLMA and fears job loss.  He reports talking them and that he doesn't have all the answers they want.    Pt reports long term daily THC use and may be self-medicating.    Plan:   Encourage 1:1 and groups for education (relapse prevention, dual dx), support and skill-building (managing symptoms, stress mgt, grief mgt, assertive communication).   Encourage abstinence from alcohol and THC.  Encourage Rx compliance and mental health linkage for follow-up.   Pt plans to return home (with his ex GF and 2 kids) and follow up with [facility].  He currently is listed as self-pay and states he has to turn in his paperwork for insurance thru [employer].             "

## 2023-10-04 LAB
ERYTHROCYTE [DISTWIDTH] IN BLOOD BY AUTOMATED COUNT: 12.2 % (ref 11.5–14.5)
HCT VFR BLD AUTO: 44.2 % (ref 41–52)
HGB BLD-MCNC: 15.3 G/DL (ref 13.5–17.5)
MCH RBC QN AUTO: 31.9 PG (ref 26–34)
MCHC RBC AUTO-ENTMCNC: 34.6 G/DL (ref 32–36)
MCV RBC AUTO: 92 FL (ref 80–100)
NRBC BLD-RTO: 0 /100 WBCS (ref 0–0)
PLATELET # BLD AUTO: 202 X10*3/UL (ref 150–450)
PMV BLD AUTO: 10.6 FL (ref 7.5–11.5)
RBC # BLD AUTO: 4.8 X10*6/UL (ref 4.5–5.9)
WBC # BLD AUTO: 8.1 X10*3/UL (ref 4.4–11.3)

## 2023-10-04 PROCEDURE — 85027 COMPLETE CBC AUTOMATED: CPT | Performed by: REGISTERED NURSE

## 2023-10-04 PROCEDURE — 2500000001 HC RX 250 WO HCPCS SELF ADMINISTERED DRUGS (ALT 637 FOR MEDICARE OP): Performed by: REGISTERED NURSE

## 2023-10-04 PROCEDURE — 97530 THERAPEUTIC ACTIVITIES: CPT | Mod: GO

## 2023-10-04 PROCEDURE — 36415 COLL VENOUS BLD VENIPUNCTURE: CPT | Performed by: REGISTERED NURSE

## 2023-10-04 PROCEDURE — 99232 SBSQ HOSP IP/OBS MODERATE 35: CPT | Performed by: REGISTERED NURSE

## 2023-10-04 PROCEDURE — 97535 SELF CARE MNGMENT TRAINING: CPT | Mod: GO

## 2023-10-04 PROCEDURE — 2500000001 HC RX 250 WO HCPCS SELF ADMINISTERED DRUGS (ALT 637 FOR MEDICARE OP): Performed by: PSYCHIATRY & NEUROLOGY

## 2023-10-04 PROCEDURE — 1240000001 HC SEMI-PRIVATE BH ROOM DAILY

## 2023-10-04 RX ORDER — TRAZODONE HYDROCHLORIDE 50 MG/1
50 TABLET ORAL NIGHTLY PRN
Status: DISCONTINUED | OUTPATIENT
Start: 2023-10-04 | End: 2023-10-05

## 2023-10-04 RX ORDER — DIVALPROEX SODIUM 500 MG/1
500 TABLET, FILM COATED, EXTENDED RELEASE ORAL NIGHTLY
Status: DISCONTINUED | OUTPATIENT
Start: 2023-10-04 | End: 2023-10-06 | Stop reason: HOSPADM

## 2023-10-04 RX ADMIN — HYDROXYZINE HYDROCHLORIDE 50 MG: 25 TABLET ORAL at 23:03

## 2023-10-04 RX ADMIN — ACETAMINOPHEN 650 MG: 325 TABLET ORAL at 05:34

## 2023-10-04 RX ADMIN — TRAZODONE HYDROCHLORIDE 50 MG: 50 TABLET ORAL at 20:30

## 2023-10-04 RX ADMIN — DIVALPROEX SODIUM 500 MG: 500 TABLET, FILM COATED, EXTENDED RELEASE ORAL at 20:30

## 2023-10-04 ASSESSMENT — PAIN - FUNCTIONAL ASSESSMENT
PAIN_FUNCTIONAL_ASSESSMENT: 0-10
PAIN_FUNCTIONAL_ASSESSMENT: 0-10

## 2023-10-04 ASSESSMENT — PAIN SCALES - GENERAL
PAINLEVEL_OUTOF10: 0 - NO PAIN
PAINLEVEL_OUTOF10: 4
PAINLEVEL_OUTOF10: 0 - NO PAIN

## 2023-10-04 NOTE — PROGRESS NOTES
"Vicente Acosta is a 33 y.o. male on day 1 of admission presenting with Psychiatric disorder.    Subjective   Patient reports that he had difficulty with sleep last night, patient is tolerating Depakote well with no adverse effects.  Patient still anxious.    Objective     MSE  General: Appropriately groomed and dressed.  Appearance: Appears stated age.  Attitude: Calm, cooperative.  Behavior: Appropriate eye contact.  Motor activity: No agitation or retardation. no EPS.  Normal gait.  Speech: Regular rate, rhythm, volume and tone.  Mood: Depressed, anxious  Affect: Appropriate range  Thought process: Organized, linear, goal-directed.  Associations are logical.  Thought content: Does not endorse suicidal or homicidal ideation, no delusions elicited.  Thought perception: Did not endorse auditory or visual hallucinations.  Cognition: Alert, oriented x3.  no deficit in memory or attention.  Insight: Fair.  Judgment: Fair.    Last Recorded Vitals  /68   Pulse 56   Temp 36.9 °C (98.4 °F) (Temporal)   Resp 16   Ht 1.727 m (5' 8\")   Wt 68 kg (150 lb)   SpO2 97%   BMI 22.81 kg/m²      Relevant Results  Scheduled medications  divalproex, 500 mg, oral, Nightly      Continuous medications     PRN medications  PRN medications: acetaminophen, diphenhydrAMINE **OR** diphenhydrAMINE, hydrOXYzine HCL, OLANZapine **OR** OLANZapine, traZODone    Results for orders placed or performed during the hospital encounter of 10/03/23 (from the past 24 hour(s))   CBC   Result Value Ref Range    WBC 8.1 4.4 - 11.3 x10*3/uL    nRBC 0.0 0.0 - 0.0 /100 WBCs    RBC 4.80 4.50 - 5.90 x10*6/uL    Hemoglobin 15.3 13.5 - 17.5 g/dL    Hematocrit 44.2 41.0 - 52.0 %    MCV 92 80 - 100 fL    MCH 31.9 26.0 - 34.0 pg    MCHC 34.6 32.0 - 36.0 g/dL    RDW 12.2 11.5 - 14.5 %    Platelets 202 150 - 450 x10*3/uL    MPV 10.6 7.5 - 11.5 fL        Assessment/Plan   Principal Problem:    Psychiatric disorder      Patient reports that he had difficulty with " sleep last night, waking often.  Patient is tolerating Depakote well with no adverse effects.  Advised patient we will increase the dose to 500 mg oral daily at bedtime tonight.  Patient reports he is anxious about FMLA paperwork.  Patient given unit phone number and fax number, with encouragement of staff he called back office who is working on his FMLA paperwork to have them fax over appropriate documents.  Patient has been active on the unit, socializing with staff and peers.    Impression:     Labs and Chart: reviewed  Case discussed with treatment team members  Encouraged patient to attend group and other mileu activity  Collateral from family - pending  Discharge planing  Medication:       -Depakote  mg oral daily at bedtime      Ab Alegria, RUI-CNP

## 2023-10-04 NOTE — GROUP NOTE
Group Topic: Self Esteem   Group Date: 10/4/2023  Start Time: 0840  End Time: 0910  Facilitators: Katherin Garibay LCSW   Department: [facility]    Number of Participants: 3   Group Focus: self-esteem  Treatment Modality: Psychoeducation  Interventions utilized were patient education  Purpose: self-care     Pt helped define self-esteem and identified  things pt did well,  what others appreciated about him/her and ways he/she takes responsibility for himself/herself.       Name: Vicente Acosta YOB: 1990   MR: [id number]      Facilitator:   Level of Participation: active  Quality of Participation:  initiated at times, but refused to participate in discussion at other times.  Interactions with others:  at times appropriate  Mood/Affect: irritable  Triggers (if applicable):   Cognition: insightful  Progress: Gaining insight or knowledge  Comments: With prompts, pt able to identify he  is a good dad, works hard and is a good person.  Recognizes people appreciates his sense of humor, and his friendship.  Reports he takes responsibility for himself by checking self into psych unit and admitting when he is at fault.  Plan: continue with services

## 2023-10-04 NOTE — CARE PLAN
Problem: Fall/Injury  Goal: Not fall by end of shift  Outcome: Progressing  Goal: Be free from injury by end of the shift  Outcome: Progressing     Problem: Sensory Perceptual Alteration as Evidenced by  Goal: Participates in unit activities  Outcome: Progressing  Goal: Free from restraint events  Outcome: Progressing     Problem: Potential for Harm to Self or Others  Goal: Identifies deescalation techniques  Outcome: Progressing     Problem: Ineffective Coping  Goal: Identifies ineffective coping skills  Outcome: Progressing  Goal: Identifies healthy coping skills  Outcome: Progressing     Problem: Defensive Coping  Goal: Identifies reckless/dangerous behavior  Outcome: Progressing   The patient's goals for the shift include      The clinical goals for the shift include Medication adherence

## 2023-10-04 NOTE — PROGRESS NOTES
Occupational Therapy     REHAB Occupational Therapy Treatment    Patient Name: Vicente Acosta  MRN: [id number]  Today's Date: 10/4/2023      Therapeutic Activity:      Treatment Approach  Approach : 30 to 45 minutes, 1 to1 Therapy sessions  Patient Stated Goals: 1-2 coping tools to decrease anxiety/anger1-2 coping tools to decrease anxiety/angerassertive skills/relaxation skills  Cognition: Attention, Directions  Social Skills: Abilities, Skills (1-2 coping tools to decrease anxiety/anger1-2 coping tools to decrease anxiety/angerassertive skills/relaxation skills)  Community Reintegration: Safety (Anger management skills)  Emotional: Behaviors, Stress  Stress Management/Relaxation Training: Identifies benefits of stress management/relaxation techniques      Encounter Problems       Encounter Problems (Active)       OT Goals       Communication/Interaction Skills (Self-expression/social Behavior/assertive) (Progressing)       Start:  10/03/23    Expected End:  10/31/23       Explore/demonstrate 1-2 effective methods of expressing feelings/wants/needs (can include assertion/engaging/sharing/asking) prior to discharge          Coping Skills (Progressing)       Start:  10/03/23    Expected End:  10/31/23       Explore/identify 1-2 effective strategies of expressing feelings, wants, needs(can include assertion, engaging, sharing, asking) prior to discharge           Emotion Regulation/self control (Progressing)       Start:  10/03/23    Expected End:  10/31/23       Pt will exhibit appropriate range, regulation of emotions, impulse control, frustration tolerance in OT groups prior to discharge.                  Educational Documents  Assertive Communication sheet  Relaxation tips

## 2023-10-04 NOTE — CARE PLAN
Patient is isolating to room.  Patient denies shower or ADL's today. Pt encouraged to participate in proper hygiene practices.  Patient reports good appetite.  Patient reports poor sleep, due to trouble falling asleep and staying asleep.  Patient denies suicidal ideation and homicidal ideation. Patient verbally contract for safety while on unit. Pt in agreement to notify staff with change of thoughts. Patient denies auditory and visual hallucinations at this time. Pt doesn't appear internally stimulated.  No voiced delusions at this time.  Patient alert and oriented. Pt evasive and would not engage in conversation. Pt answer most questions with one or two word responses.

## 2023-10-05 VITALS
HEIGHT: 68 IN | OXYGEN SATURATION: 98 % | DIASTOLIC BLOOD PRESSURE: 83 MMHG | SYSTOLIC BLOOD PRESSURE: 176 MMHG | BODY MASS INDEX: 22.73 KG/M2 | WEIGHT: 150 LBS | RESPIRATION RATE: 16 BRPM | HEART RATE: 69 BPM | TEMPERATURE: 97.3 F

## 2023-10-05 LAB — CARBOXYTHC UR-MCNC: 467 NG/ML

## 2023-10-05 PROCEDURE — 2500000004 HC RX 250 GENERAL PHARMACY W/ HCPCS (ALT 636 FOR OP/ED): Performed by: REGISTERED NURSE

## 2023-10-05 PROCEDURE — 1240000001 HC SEMI-PRIVATE BH ROOM DAILY

## 2023-10-05 PROCEDURE — 2500000001 HC RX 250 WO HCPCS SELF ADMINISTERED DRUGS (ALT 637 FOR MEDICARE OP): Performed by: REGISTERED NURSE

## 2023-10-05 PROCEDURE — 99233 SBSQ HOSP IP/OBS HIGH 50: CPT | Performed by: PSYCHIATRY & NEUROLOGY

## 2023-10-05 RX ORDER — QUETIAPINE FUMARATE 25 MG/1
25 TABLET, FILM COATED ORAL NIGHTLY
Status: DISCONTINUED | OUTPATIENT
Start: 2023-10-05 | End: 2023-10-06 | Stop reason: HOSPADM

## 2023-10-05 RX ADMIN — DIVALPROEX SODIUM 500 MG: 500 TABLET, FILM COATED, EXTENDED RELEASE ORAL at 20:25

## 2023-10-05 RX ADMIN — QUETIAPINE FUMARATE 25 MG: 25 TABLET, FILM COATED ORAL at 20:25

## 2023-10-05 ASSESSMENT — PAIN SCALES - GENERAL
PAINLEVEL_OUTOF10: 0 - NO PAIN
PAINLEVEL_OUTOF10: 0 - NO PAIN

## 2023-10-05 ASSESSMENT — PAIN - FUNCTIONAL ASSESSMENT
PAIN_FUNCTIONAL_ASSESSMENT: 0-10
PAIN_FUNCTIONAL_ASSESSMENT: 0-10

## 2023-10-05 NOTE — CARE PLAN
"Pt. Calm and cooperative today, no irritability or anger outbursts observed today.  Pt. States that he had some trouble sleeping last night, starting on Seroquel tonight.  Pt. Denies having any suicidal or homicidal ideations today.  Pt. Compliant with medications, denies feeling any negative side effects.     The patient's goals for the shift include \"Just try to manage my anger\"    The clinical goals for the shift include Participate in group therapies.    Problem: Fall/Injury  Goal: Not fall by end of shift  Outcome: Progressing  Goal: Be free from injury by end of the shift  Outcome: Progressing  Goal: Verbalize understanding of personal risk factors for fall in the hospital  Outcome: Progressing  Goal: Verbalize understanding of risk factor reduction measures to prevent injury from fall in the home  Outcome: Progressing  Goal: Use assistive devices by end of the shift  Outcome: Progressing  Goal: Pace activities to prevent fatigue by end of the shift  Outcome: Progressing     "

## 2023-10-05 NOTE — CARE PLAN
"Met with pt privately to assess current affect and mood. Pt affect calm, euthymic. Pt denies SI and HI at this time. Pt denies having depression and anxiety at this time. Pt denies having hallucinations of any kind and does not currently seem to be responding to internal stimuli. Pt reports having a good appetite throughout the day shift. Pt slept 7 hrs this shift.       The patient's goals for the shift include \"take my meds and go to bed.\"     The clinical goals for the shift include medication adherence and participation in treatment plan     Over the shift, the patient did make progress toward the following goals. Barriers to progression include current mental health. Recommendations to address these barriers include medication adherence and participation in the treatment plan.        Problem: Fall/Injury  Goal: Not fall by end of shift  Outcome: Progressing  Goal: Be free from injury by end of the shift  Outcome: Progressing  Goal: Verbalize understanding of personal risk factors for fall in the hospital  Outcome: Progressing  Goal: Verbalize understanding of risk factor reduction measures to prevent injury from fall in the home  Outcome: Progressing  Goal: Use assistive devices by end of the shift  Outcome: Progressing  Goal: Pace activities to prevent fatigue by end of the shift  Outcome: Progressing     Problem: Sensory Perceptual Alteration as Evidenced by  Goal: Cooperates with admission process  Outcome: Progressing  Goal: Patient/Family participate in treatment and discharge plans  Outcome: Progressing  Goal: Patient/Family verbalizes awareness of resources  Outcome: Progressing  Goal: Participates in unit activities  Outcome: Progressing  Goal: Discusses signs/symptoms of illness/treatment options  Outcome: Progressing  Goal: Initiates reality-based interactions  Outcome: Progressing  Goal: Able to discuss content of hallucinations/delusions  Outcome: Progressing  Goal: Notifies staff when experiencing " hallucinations/delusions  Outcome: Progressing  Goal: Verbalizes reduction in hallucinations/delusions  Outcome: Progressing  Goal: Will not act on psychotic perception  Outcome: Progressing  Goal: Understands least restrictive measures  Outcome: Progressing  Goal: Free from restraint events  Outcome: Progressing     Problem: Altered Thought Processes as Evidenced by  Goal: STG - Desires improvement in ability to think and concentrate  Outcome: Progressing  Goal: STG - Participates in Occupational Therapy and other cognitive assessments  Outcome: Progressing     Problem: Potential for Harm to Self or Others  Goal: Cooperates with admission process  Outcome: Progressing  Goal: Participates in unit activities  Outcome: Progressing  Goal: Patient/Family participate in treatment and discharge plans  Outcome: Progressing  Goal: Identifies deescalation techniques  Outcome: Progressing  Goal: Understands least restrictive measures  Outcome: Progressing  Goal: Identifies stressors that lead to harmful behaviors  Outcome: Progressing  Goal: Notifies staff when experiencing harmful thoughts toward self/others  Outcome: Progressing  Goal: Denies harm toward self or others  Outcome: Progressing  Goal: Free from restraint events  Outcome: Progressing     Problem: Educational/Scholastic Disruption  Goal: Meets educational requirements during hospitalization  Outcome: Progressing  Goal: Attends class without disruptive behavior  Outcome: Progressing  Goal: Completes daily assignments  Outcome: Progressing     Problem: Ineffective Coping  Goal: Cooperates with admission process  Outcome: Progressing  Goal: Identifies ineffective coping skills  Outcome: Progressing  Goal: Identifies healthy coping skills  Outcome: Progressing  Goal: Demonstrates healthy coping skills  Outcome: Progressing  Goal: Participates in unit activities  Outcome: Progressing  Goal: Patient/Family participate in treatment and discharge plans  Outcome:  Progressing  Goal: Patient/Family verbalizes awareness of resources  Outcome: Progressing  Goal: Understands least restrictive measures  Outcome: Progressing  Goal: Free from restraint events  Outcome: Progressing     Problem: Alteration in Sleep  Goal: STG - Reports nightly sleep, duration, and quality  Outcome: Progressing  Goal: STG - Identifies sleep hygiene aids  Outcome: Progressing  Goal: STG - Informs staff if unable to sleep  Outcome: Progressing  Goal: STG - Attends breathing and relaxation group  Outcome: Progressing     Problem: Potential for Substance Withdrawal  Goal: Verbalizes signs/symptoms of withdrawal  Outcome: Progressing  Goal: Reports signs/symptoms of withdrawal  Outcome: Progressing  Goal: Free of withdrawal symptoms  Outcome: Progressing     Problem: Anxiety  Goal: Patient/family understands admission protocols  Outcome: Progressing  Goal: Attempts to manage anxiety with help  Outcome: Progressing  Goal: Verbalizes ways to manage anxiety  Outcome: Progressing  Goal: Implements measures to reduce anxiety  Outcome: Progressing  Goal: Free from restraint events  Outcome: Progressing     Problem: Self Care Deficit  Goal: STG - Patient completes hygiene  Outcome: Progressing  Goal: Increase group attendance  Outcome: Progressing  Goal: Accepts need for medications  Outcome: Progressing  Goal: STG - Goes to and eats meals independently in dining room 100% of time  Outcome: Progressing     Problem: Defensive Coping  Goal: Cooperates with admission process  Outcome: Progressing  Goal: Identifies reckless/dangerous behavior  Outcome: Progressing  Goal: Identifies stressors that lead to reckless/dangerous behavior  Outcome: Progressing  Goal: Discusses and identifies healthy coping skills  Outcome: Progressing  Goal: Demonstrates healthy coping skills  Outcome: Progressing  Goal: Identifies appropriate social interaction  Outcome: Progressing  Goal: Demonstrates appropriate social  interactions  Outcome: Progressing  Goal: Patient/Family verbalizes awareness of resources  Outcome: Progressing  Goal: Discusses signs/symptoms of illness/treatment options  Outcome: Progressing  Goal: Patient/Family participate in treatment and discharge plans  Outcome: Progressing  Goal: Understands least restrictive measures  Outcome: Progressing  Goal: Free from restraint events  Outcome: Progressing

## 2023-10-05 NOTE — PROGRESS NOTES
"Vicente Acosta is a 33 y.o. male on day 2 of admission presenting with bipolar disorder.    Subjective   Patient reports that he slept very poorly last night.  Patient has been active on the unit, social with staff and peers.    Objective     MSE  General: Appropriately groomed and dressed.  Appearance: Appears stated age.  Attitude: Calm, cooperative.  Behavior: Appropriate eye contact.  Motor activity: No agitation or retardation. no EPS.  Normal gait.  Speech: Regular rate, rhythm, volume and tone.  Mood: Less depressed, less irritable  Affect: Appropriate range  Thought process: Organized, linear, goal-directed.  Associations are logical.  Thought content: Does not endorse suicidal or homicidal ideation, no delusions elicited.  Thought perception: Did not endorse auditory or visual hallucinations.  Cognition: Alert, oriented x3.  no deficit in memory or attention.  Insight: Fair.  Judgment: Fair.    Last Recorded Vitals  /75   Pulse 57   Temp 36.1 °C (97 °F)   Resp 16   Ht 1.727 m (5' 8\")   Wt 68 kg (150 lb)   SpO2 90%   BMI 22.81 kg/m²      Relevant Results  Scheduled medications  divalproex, 500 mg, oral, Nightly  QUEtiapine, 25 mg, oral, Nightly      Continuous medications     PRN medications  PRN medications: acetaminophen, diphenhydrAMINE **OR** diphenhydrAMINE, hydrOXYzine HCL, OLANZapine **OR** OLANZapine    No results found for this or any previous visit (from the past 24 hour(s)).     Assessment/Plan   Diagnosis:  Bipolar disorder    Patient reports that he slept poorly last night, states that he tossed and turned.  Reports that he had some bilateral pain in his lower abdomen.  Patient has been active on the unit, social with staff and peers.  Treatment team sat down with patient and helped him fill out [facility] paperwork today, to fax to his employer.  Advised patient we will start Seroquel 25 mg 1 daily at bedtime to help with mood stabilization/insomnia.  Patient in agreement with " plan.    Impression:     Labs and Chart: reviewed  Case discussed with treatment team members  Encouraged patient to attend group and other mileu activity  Collateral from family - pending  Discharge planing  Medication:       -Depakote  mg oral daily at bedtime       -Seroquel 25 mg oral daily at bedtime  7.  Draw CMP, CBC, valproic acid level in the morning    RUI Lundy-CNP

## 2023-10-06 LAB
ALBUMIN SERPL BCP-MCNC: 4.9 G/DL (ref 3.4–5)
ALP SERPL-CCNC: 67 U/L (ref 33–120)
ALT SERPL W P-5'-P-CCNC: 17 U/L (ref 10–52)
ANION GAP SERPL CALC-SCNC: 11 MMOL/L (ref 10–20)
AST SERPL W P-5'-P-CCNC: 15 U/L (ref 9–39)
BILIRUB SERPL-MCNC: 0.8 MG/DL (ref 0–1.2)
BUN SERPL-MCNC: 17 MG/DL (ref 6–23)
CALCIUM SERPL-MCNC: 10.2 MG/DL (ref 8.6–10.3)
CHLORIDE SERPL-SCNC: 102 MMOL/L (ref 98–107)
CO2 SERPL-SCNC: 33 MMOL/L (ref 21–32)
CREAT SERPL-MCNC: 0.84 MG/DL (ref 0.5–1.3)
ERYTHROCYTE [DISTWIDTH] IN BLOOD BY AUTOMATED COUNT: 12 % (ref 11.5–14.5)
GFR SERPL CREATININE-BSD FRML MDRD: >90 ML/MIN/1.73M*2
GLUCOSE SERPL-MCNC: 82 MG/DL (ref 74–99)
HCT VFR BLD AUTO: 50.7 % (ref 41–52)
HGB BLD-MCNC: 17.2 G/DL (ref 13.5–17.5)
MCH RBC QN AUTO: 32 PG (ref 26–34)
MCHC RBC AUTO-ENTMCNC: 33.9 G/DL (ref 32–36)
MCV RBC AUTO: 94 FL (ref 80–100)
NRBC BLD-RTO: 0 /100 WBCS (ref 0–0)
PLATELET # BLD AUTO: 244 X10*3/UL (ref 150–450)
PMV BLD AUTO: 10.1 FL (ref 7.5–11.5)
POTASSIUM SERPL-SCNC: 4.8 MMOL/L (ref 3.5–5.3)
PROT SERPL-MCNC: 8.1 G/DL (ref 6.4–8.2)
RBC # BLD AUTO: 5.38 X10*6/UL (ref 4.5–5.9)
SODIUM SERPL-SCNC: 141 MMOL/L (ref 136–145)
VALPROATE SERPL-MCNC: 31 UG/ML (ref 50–100)
WBC # BLD AUTO: 9.5 X10*3/UL (ref 4.4–11.3)

## 2023-10-06 PROCEDURE — 97530 THERAPEUTIC ACTIVITIES: CPT | Mod: GO

## 2023-10-06 PROCEDURE — 97150 GROUP THERAPEUTIC PROCEDURES: CPT | Mod: GO

## 2023-10-06 PROCEDURE — 2500000001 HC RX 250 WO HCPCS SELF ADMINISTERED DRUGS (ALT 637 FOR MEDICARE OP): Performed by: REGISTERED NURSE

## 2023-10-06 PROCEDURE — 80053 COMPREHEN METABOLIC PANEL: CPT | Performed by: REGISTERED NURSE

## 2023-10-06 PROCEDURE — 36415 COLL VENOUS BLD VENIPUNCTURE: CPT | Performed by: REGISTERED NURSE

## 2023-10-06 PROCEDURE — 80164 ASSAY DIPROPYLACETIC ACD TOT: CPT | Performed by: REGISTERED NURSE

## 2023-10-06 PROCEDURE — 85027 COMPLETE CBC AUTOMATED: CPT | Performed by: REGISTERED NURSE

## 2023-10-06 PROCEDURE — 99239 HOSP IP/OBS DSCHRG MGMT >30: CPT | Performed by: PSYCHIATRY & NEUROLOGY

## 2023-10-06 RX ORDER — QUETIAPINE FUMARATE 25 MG/1
25 TABLET, FILM COATED ORAL NIGHTLY
Qty: 30 TABLET | Refills: 0 | Status: SHIPPED | OUTPATIENT
Start: 2023-10-06 | End: 2023-11-05

## 2023-10-06 RX ORDER — DIVALPROEX SODIUM 250 MG/1
250 TABLET, FILM COATED, EXTENDED RELEASE ORAL DAILY
Status: DISCONTINUED | OUTPATIENT
Start: 2023-10-06 | End: 2023-10-06 | Stop reason: HOSPADM

## 2023-10-06 RX ORDER — HYDROXYZINE HYDROCHLORIDE 50 MG/1
50 TABLET, FILM COATED ORAL 2 TIMES DAILY PRN
Qty: 60 TABLET | Refills: 0 | Status: SHIPPED | OUTPATIENT
Start: 2023-10-06 | End: 2023-11-05

## 2023-10-06 RX ORDER — DIVALPROEX SODIUM 250 MG/1
750 TABLET, FILM COATED, EXTENDED RELEASE ORAL NIGHTLY
Qty: 90 TABLET | Refills: 0 | Status: SHIPPED | OUTPATIENT
Start: 2023-10-06 | End: 2023-11-05

## 2023-10-06 RX ADMIN — DIVALPROEX SODIUM 250 MG: 250 TABLET, FILM COATED, EXTENDED RELEASE ORAL at 11:55

## 2023-10-06 ASSESSMENT — PAIN SCALES - GENERAL
PAINLEVEL_OUTOF10: 0 - NO PAIN
PAINLEVEL_OUTOF10: 0 - NO PAIN

## 2023-10-06 ASSESSMENT — PAIN - FUNCTIONAL ASSESSMENT: PAIN_FUNCTIONAL_ASSESSMENT: 0-10

## 2023-10-06 NOTE — PROGRESS NOTES
Occupational Therapy     REHAB Occupational Therapy Treatment     Patient Name: Vicente Acosta  MRN: [id number]  Today's Date: 10/6/2023  Therapeutic activity  Emotional Self-care(Rational problem solving tools) Safety Judgement (Suicide Hotline numbers) Emotional Regulation Skills: · Anxiety Management(Relaxation skills)Rational problemsolving tools/relaxation skills     Treatment Approach  Approach : 30 to 45 minutes  Patient Stated Goals: Rational problemsolving tools/relaxation skills  Cognition: Attention, Directions  Social Skills: Abilities, Skills  Community Reintegration: Awareness, Directions, Safety  Emotional: Behaviors, Mood, Stress  Stress Management/Relaxation Training: Identifies difficulty adjusting to illness, hospitalization, or pain, Performs stress management/relaxation techniques      Encounter Problems       Encounter Problems (Active)       OT Goals       Communication/Interaction Skills (Self-expression/social Behavior/assertive) (Progressing)       Start:  10/03/23    Expected End:  10/31/23       Explore/demonstrate 1-2 effective methods of expressing feelings/wants/needs (can include assertion/engaging/sharing/asking) prior to discharge          Coping Skills (Progressing)       Start:  10/03/23    Expected End:  10/31/23       Explore/identify 1-2 effective strategies of expressing feelings, wants, needs(can include assertion, engaging, sharing, asking) prior to discharge           Emotion Regulation/self control (Progressing)       Start:  10/03/23    Expected End:  10/31/23       Pt will exhibit appropriate range, regulation of emotions, impulse control, frustration tolerance in OT groups prior to discharge.                  Educational Documents reviewed/completed

## 2023-10-06 NOTE — GROUP NOTE
Group Topic: Music Therapy   Group Date: 10/5/2023  Start Time: 1300  End Time: 1400  Facilitators: Jennifer Bowers   Department: [facility]    Number of Participants: 6   Group Focus: music therapy  Treatment Modality: Music Therapy  Interventions Utilized were: active music engagement, passive music engagement, problem solving, and sharing/discussion      Name: Vicente Acosta YOB: 1990   MR: [id number]      Level of Participation: withdrawn  Quality of Participation: isolative and quiet  Interactions with others:  Did not interact with others  Mood/Affect: flat  Cognition, Pre Treatment: capable  Cognition, Post Treatment: capable and inattentive  Progress: Minimal  Plan: continue with services
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
## 2023-10-06 NOTE — CARE PLAN
"The patient's goals for the shift include \"Just try to manage my anger\"    The clinical goals for the shift include Participate in group therapies.    Pt has been visible on the unit and social with peers and ate a snack. Pt currently denies suicidal and homicidal ideation and denies auditory and visual hallucinations. Pt affect is reactive and mood has been calm and cooperative and denies any anxiety or depression at this time. Pt took bed time meds and currently resting quietly at this time. Will continue to monitor.    "

## 2023-12-13 LAB
HOLD SPECIMEN: NORMAL
HOLD SPECIMEN: NORMAL